# Patient Record
Sex: FEMALE | URBAN - METROPOLITAN AREA
[De-identification: names, ages, dates, MRNs, and addresses within clinical notes are randomized per-mention and may not be internally consistent; named-entity substitution may affect disease eponyms.]

---

## 2022-10-07 ENCOUNTER — OFFICE VISIT (OUTPATIENT)
Dept: URBAN - METROPOLITAN AREA CLINIC 26 | Facility: CLINIC | Age: 57
End: 2022-10-07
Payer: MEDICARE

## 2022-10-07 DIAGNOSIS — H40.013 OPEN ANGLE WITH BORDERLINE FINDINGS, LOW RISK, BILATERAL: Primary | ICD-10-CM

## 2022-10-07 PROCEDURE — 92133 CPTRZD OPH DX IMG PST SGM ON: CPT | Performed by: STUDENT IN AN ORGANIZED HEALTH CARE EDUCATION/TRAINING PROGRAM

## 2022-10-07 PROCEDURE — 99203 OFFICE O/P NEW LOW 30 MIN: CPT | Performed by: STUDENT IN AN ORGANIZED HEALTH CARE EDUCATION/TRAINING PROGRAM

## 2022-10-07 ASSESSMENT — KERATOMETRY
OD: 44.13
OS: 45.00

## 2022-10-07 ASSESSMENT — VISUAL ACUITY
OD: 20/20
OS: 20/20

## 2022-10-07 ASSESSMENT — INTRAOCULAR PRESSURE
OD: 19
OS: 18

## 2022-10-07 NOTE — IMPRESSION/PLAN
Impression: Open angle with borderline findings, low risk, bilateral: H40.013.
-- ddx physiological cupping
-- Referred by Zenobia Coleman, OD, 2' vertical elongation
-- denies fhx of glaucoma Plan: IOP 19/18, wnl Baseline OCT RNFL
   OD 96 / wnl 360  / wnl 360 Pt ed condition, findings and risk for glaucoma which can cause irreversible vision loss. Discussed importance of f/u care. No treatment necessary at this time. 

RTC for baseline photos / Sharmin Coho / visual field

## 2023-04-20 ENCOUNTER — OFFICE VISIT (OUTPATIENT)
Dept: URBAN - METROPOLITAN AREA CLINIC 26 | Facility: CLINIC | Age: 58
End: 2023-04-20
Payer: MEDICARE

## 2023-04-20 DIAGNOSIS — H40.013 OPEN ANGLE WITH BORDERLINE FINDINGS, LOW RISK, BILATERAL: Primary | ICD-10-CM

## 2023-04-20 PROCEDURE — 76514 ECHO EXAM OF EYE THICKNESS: CPT | Performed by: OPTOMETRIST

## 2023-04-20 PROCEDURE — 92083 EXTENDED VISUAL FIELD XM: CPT | Performed by: OPTOMETRIST

## 2023-04-20 PROCEDURE — 99213 OFFICE O/P EST LOW 20 MIN: CPT | Performed by: OPTOMETRIST

## 2023-04-20 ASSESSMENT — INTRAOCULAR PRESSURE
OD: 20
OS: 19

## 2023-04-20 NOTE — IMPRESSION/PLAN
Impression: Open angle with borderline findings, low risk, bilateral: H40.013.
--PACHS 523/522
--VF OD Superior Arcuate OD, Full OS
-- ddx physiological cupping
-- Referred by Zenobia Coleman, OD, 2' vertical elongation
-- denies fhx of glaucoma -- Plan: VF and PACHS ordered and preformed today OU. VF full OS. mild defects OD   IOP at acceptable level today OU.
monitor without drops for now. repeat VF. 
F/U 6 months DE IOP VF OCT RNFL